# Patient Record
Sex: FEMALE | Race: WHITE | NOT HISPANIC OR LATINO | URBAN - METROPOLITAN AREA
[De-identification: names, ages, dates, MRNs, and addresses within clinical notes are randomized per-mention and may not be internally consistent; named-entity substitution may affect disease eponyms.]

---

## 2023-08-23 ENCOUNTER — TELEPHONE (OUTPATIENT)
Dept: PRIMARY CARE | Facility: CLINIC | Age: 58
End: 2023-08-23
Payer: COMMERCIAL

## 2023-08-23 NOTE — TELEPHONE ENCOUNTER
Pt lvm to cancel appt with Robusto because everything is cleared up.     Appt canceled lvm confirming cancellation

## 2023-08-24 ENCOUNTER — APPOINTMENT (OUTPATIENT)
Dept: PRIMARY CARE | Facility: CLINIC | Age: 58
End: 2023-08-24
Payer: COMMERCIAL

## 2023-11-08 ENCOUNTER — TELEPHONE (OUTPATIENT)
Dept: PRIMARY CARE | Facility: CLINIC | Age: 58
End: 2023-11-08
Payer: COMMERCIAL

## 2023-11-08 NOTE — TELEPHONE ENCOUNTER
Patient states has had stomach pain for a while but for the past 2 weeks she has had off and on diarrhea.  Would like to be seen if possible.  Please advise.

## 2024-08-29 ENCOUNTER — APPOINTMENT (OUTPATIENT)
Dept: PRIMARY CARE | Facility: CLINIC | Age: 59
End: 2024-08-29
Payer: COMMERCIAL

## 2024-08-29 VITALS
HEART RATE: 102 BPM | HEIGHT: 66 IN | SYSTOLIC BLOOD PRESSURE: 120 MMHG | DIASTOLIC BLOOD PRESSURE: 80 MMHG | WEIGHT: 183 LBS | OXYGEN SATURATION: 98 % | BODY MASS INDEX: 29.41 KG/M2

## 2024-08-29 DIAGNOSIS — Z12.31 VISIT FOR SCREENING MAMMOGRAM: ICD-10-CM

## 2024-08-29 DIAGNOSIS — R60.9 EDEMA, UNSPECIFIED TYPE: Primary | ICD-10-CM

## 2024-08-29 DIAGNOSIS — E55.9 VITAMIN D DEFICIENCY: ICD-10-CM

## 2024-08-29 DIAGNOSIS — N95.1 MENOPAUSAL HOT FLUSHES: ICD-10-CM

## 2024-08-29 DIAGNOSIS — E66.3 OVERWEIGHT WITH BODY MASS INDEX (BMI) OF 29 TO 29.9 IN ADULT: ICD-10-CM

## 2024-08-29 DIAGNOSIS — Z00.00 ANNUAL PHYSICAL EXAM: ICD-10-CM

## 2024-08-29 PROCEDURE — 99214 OFFICE O/P EST MOD 30 MIN: CPT | Performed by: FAMILY MEDICINE

## 2024-08-29 PROCEDURE — 3008F BODY MASS INDEX DOCD: CPT | Performed by: FAMILY MEDICINE

## 2024-08-29 RX ORDER — SPIRONOLACTONE 50 MG/1
1 TABLET, FILM COATED ORAL
COMMUNITY
Start: 2024-08-05

## 2024-08-29 RX ORDER — CHOLECALCIFEROL (VITAMIN D3) 50 MCG
2000 TABLET ORAL DAILY
COMMUNITY

## 2024-08-29 ASSESSMENT — LIFESTYLE VARIABLES
HOW MANY STANDARD DRINKS CONTAINING ALCOHOL DO YOU HAVE ON A TYPICAL DAY: 1 OR 2
HOW OFTEN DO YOU HAVE A DRINK CONTAINING ALCOHOL: MONTHLY OR LESS
HOW OFTEN DO YOU HAVE SIX OR MORE DRINKS ON ONE OCCASION: NEVER
AUDIT-C TOTAL SCORE: 1
SKIP TO QUESTIONS 9-10: 1

## 2024-08-29 NOTE — PATIENT INSTRUCTIONS
There are many weight loss medication options as listed below:  Oral forms: Contrave, Qsymia  Injectable forms: Saxenda, Wegovy, Zepbound  Please check with your insurance company regarding coverage - please confirm what diagnosis these are covered under as some insurance companies will not cover these medications unless you have a diagnosis of Diabetes or Prediabetes.    Once you confirm coverage please call the office to arrange an in office appointment to discuss your weight loss journey and which options we feel are most appropriative for you.

## 2024-08-29 NOTE — PROGRESS NOTES
"Subjective   Patient ID: Cristina Huff is a 59 y.o. female who presents for Leg Swelling (Has been going on and off, but seems to be worse now and not going away.) and Fatigue (More fatigue and brain fog- getting worse.).    HPI  MENOPAUSAL SYMPTOMS:  \"BRAIN FOG\" - Difficulty with quick recall and expressing symptoms  More forgetful    WEIGHT GAIN: gradually over the last few weeks  MENOPAUSAL - 5 years, hot flashes a few times per day currently    LEG SWELLING: has been worsening over the last few months  No changes in diet or activty    She has been on Spironolactone for the last month or so for  facial hair    FATIGUE: sleep disrupted 2 times to void  Usually not bad  By 3 pm she is ready for a nap  She does snore  but no apnea noted    Remote history of cardiac ablation  Previously followed by JOHN Jay at [facility]  Last ablation 16-20 years ago    Review of Systems    Review of Systems negative except as noted in HPI and Chief complaint.     Objective   Patient Health Questionnaire-9 Score: 8     REY-7 Total Score: 2     VITALS:  /80 (BP Location: Left arm, Patient Position: Sitting, BP Cuff Size: Adult)   Pulse 102   Ht 1.676 m (5' 6\")   Wt 83 kg (183 lb)   SpO2 98%   BMI 29.54 kg/m²      Physical Exam  Constitutional:       General: She is not in acute distress.     Appearance: Normal appearance. She is not ill-appearing.   HENT:      Head: Normocephalic and atraumatic.   Neck:      Vascular: No carotid bruit.   Cardiovascular:      Rate and Rhythm: Normal rate and regular rhythm.      Pulses: Normal pulses.      Heart sounds: Normal heart sounds. No murmur heard.     No gallop.   Pulmonary:      Effort: Pulmonary effort is normal.      Breath sounds: Normal breath sounds. No wheezing, rhonchi or rales.   Musculoskeletal:      Cervical back: Normal range of motion and neck supple. No rigidity or tenderness.   Lymphadenopathy:      Cervical: No cervical adenopathy.   Skin:     General: Skin " is warm and dry.   Neurological:      Mental Status: She is alert.   Psychiatric:         Mood and Affect: Mood normal.         Behavior: Behavior normal.         Assessment/Plan   Problem List Items Addressed This Visit    None  Visit Diagnoses       Edema, unspecified type    -  Primary    Menopausal hot flushes        Visit for screening mammogram        Relevant Orders    BI mammo bilateral screening tomosynthesis    Vitamin D deficiency        Relevant Orders    Vitamin D 25-Hydroxy,Total (for eval of Vitamin D levels)    Annual physical exam        Relevant Orders    CBC    Comprehensive Metabolic Panel    Lipid Panel    TSH with reflex to Free T4 if abnormal            FOLLOW UP  for CPE and review of labs , SOONER WITH ANY PROBLEMS OR CONCERNS.

## 2024-09-04 PROBLEM — E66.3 OVERWEIGHT WITH BODY MASS INDEX (BMI) OF 29 TO 29.9 IN ADULT: Status: ACTIVE | Noted: 2024-09-04

## 2024-09-04 ASSESSMENT — PATIENT HEALTH QUESTIONNAIRE - PHQ9
7. TROUBLE CONCENTRATING ON THINGS, SUCH AS READING THE NEWSPAPER OR WATCHING TELEVISION: MORE THAN HALF THE DAYS
3. TROUBLE FALLING OR STAYING ASLEEP OR SLEEPING TOO MUCH: SEVERAL DAYS
9. THOUGHTS THAT YOU WOULD BE BETTER OFF DEAD, OR OF HURTING YOURSELF: NOT AT ALL
8. MOVING OR SPEAKING SO SLOWLY THAT OTHER PEOPLE COULD HAVE NOTICED. OR THE OPPOSITE, BEING SO FIGETY OR RESTLESS THAT YOU HAVE BEEN MOVING AROUND A LOT MORE THAN USUAL: NOT AT ALL
5. POOR APPETITE OR OVEREATING: SEVERAL DAYS
SUM OF ALL RESPONSES TO PHQ9 QUESTIONS 1 AND 2: 1
4. FEELING TIRED OR HAVING LITTLE ENERGY: MORE THAN HALF THE DAYS
2. FEELING DOWN, DEPRESSED OR HOPELESS: NOT AT ALL
SUM OF ALL RESPONSES TO PHQ QUESTIONS 1-9: 8
6. FEELING BAD ABOUT YOURSELF - OR THAT YOU ARE A FAILURE OR HAVE LET YOURSELF OR YOUR FAMILY DOWN: SEVERAL DAYS
1. LITTLE INTEREST OR PLEASURE IN DOING THINGS: SEVERAL DAYS
10. IF YOU CHECKED OFF ANY PROBLEMS, HOW DIFFICULT HAVE THESE PROBLEMS MADE IT FOR YOU TO DO YOUR WORK, TAKE CARE OF THINGS AT HOME, OR GET ALONG WITH OTHER PEOPLE: NOT DIFFICULT AT ALL

## 2024-09-04 ASSESSMENT — ANXIETY QUESTIONNAIRES
2. NOT BEING ABLE TO STOP OR CONTROL WORRYING: NOT AT ALL
GAD7 TOTAL SCORE: 2
IF YOU CHECKED OFF ANY PROBLEMS ON THIS QUESTIONNAIRE, HOW DIFFICULT HAVE THESE PROBLEMS MADE IT FOR YOU TO DO YOUR WORK, TAKE CARE OF THINGS AT HOME, OR GET ALONG WITH OTHER PEOPLE: NOT DIFFICULT AT ALL
1. FEELING NERVOUS, ANXIOUS, OR ON EDGE: SEVERAL DAYS
7. FEELING AFRAID AS IF SOMETHING AWFUL MIGHT HAPPEN: NOT AT ALL
5. BEING SO RESTLESS THAT IT IS HARD TO SIT STILL: NOT AT ALL
6. BECOMING EASILY ANNOYED OR IRRITABLE: SEVERAL DAYS
3. WORRYING TOO MUCH ABOUT DIFFERENT THINGS: NOT AT ALL
4. TROUBLE RELAXING: NOT AT ALL

## 2024-09-12 ENCOUNTER — LAB (OUTPATIENT)
Dept: LAB | Facility: LAB | Age: 59
End: 2024-09-12
Payer: COMMERCIAL

## 2024-09-12 DIAGNOSIS — R73.01 ABNORMAL FASTING GLUCOSE: ICD-10-CM

## 2024-09-12 DIAGNOSIS — E55.9 VITAMIN D DEFICIENCY: ICD-10-CM

## 2024-09-12 DIAGNOSIS — Z00.00 ANNUAL PHYSICAL EXAM: ICD-10-CM

## 2024-09-12 LAB
25(OH)D3 SERPL-MCNC: 27 NG/ML (ref 30–100)
ALBUMIN SERPL BCP-MCNC: 4.5 G/DL (ref 3.4–5)
ALP SERPL-CCNC: 89 U/L (ref 33–136)
ALT SERPL W P-5'-P-CCNC: 32 U/L (ref 7–45)
ANION GAP SERPL CALC-SCNC: 16 MMOL/L (ref 10–20)
AST SERPL W P-5'-P-CCNC: 19 U/L (ref 9–39)
BILIRUB SERPL-MCNC: 0.6 MG/DL (ref 0–1.2)
BUN SERPL-MCNC: 13 MG/DL (ref 6–23)
CALCIUM SERPL-MCNC: 9.7 MG/DL (ref 8.6–10.6)
CHLORIDE SERPL-SCNC: 103 MMOL/L (ref 98–107)
CHOLEST SERPL-MCNC: 231 MG/DL (ref 0–199)
CHOLESTEROL/HDL RATIO: 3.3
CO2 SERPL-SCNC: 26 MMOL/L (ref 21–32)
CREAT SERPL-MCNC: 0.82 MG/DL (ref 0.5–1.05)
EGFRCR SERPLBLD CKD-EPI 2021: 81 ML/MIN/1.73M*2
ERYTHROCYTE [DISTWIDTH] IN BLOOD BY AUTOMATED COUNT: 13.3 % (ref 11.5–14.5)
GLUCOSE SERPL-MCNC: 107 MG/DL (ref 74–99)
HCT VFR BLD AUTO: 46.6 % (ref 36–46)
HDLC SERPL-MCNC: 70.5 MG/DL
HGB BLD-MCNC: 15.4 G/DL (ref 12–16)
LDLC SERPL CALC-MCNC: 127 MG/DL
MCH RBC QN AUTO: 30.3 PG (ref 26–34)
MCHC RBC AUTO-ENTMCNC: 33 G/DL (ref 32–36)
MCV RBC AUTO: 92 FL (ref 80–100)
NON HDL CHOLESTEROL: 161 MG/DL (ref 0–149)
NRBC BLD-RTO: 0 /100 WBCS (ref 0–0)
PLATELET # BLD AUTO: 297 X10*3/UL (ref 150–450)
POTASSIUM SERPL-SCNC: 4.5 MMOL/L (ref 3.5–5.3)
PROT SERPL-MCNC: 7.2 G/DL (ref 6.4–8.2)
RBC # BLD AUTO: 5.08 X10*6/UL (ref 4–5.2)
SODIUM SERPL-SCNC: 140 MMOL/L (ref 136–145)
TRIGL SERPL-MCNC: 170 MG/DL (ref 0–149)
TSH SERPL-ACNC: 3.19 MIU/L (ref 0.44–3.98)
VLDL: 34 MG/DL (ref 0–40)
WBC # BLD AUTO: 5.2 X10*3/UL (ref 4.4–11.3)

## 2024-09-12 PROCEDURE — 80053 COMPREHEN METABOLIC PANEL: CPT

## 2024-09-12 PROCEDURE — 36415 COLL VENOUS BLD VENIPUNCTURE: CPT

## 2024-09-12 PROCEDURE — 84443 ASSAY THYROID STIM HORMONE: CPT

## 2024-09-12 PROCEDURE — 83036 HEMOGLOBIN GLYCOSYLATED A1C: CPT

## 2024-09-12 PROCEDURE — 82306 VITAMIN D 25 HYDROXY: CPT

## 2024-09-12 PROCEDURE — 85027 COMPLETE CBC AUTOMATED: CPT

## 2024-09-12 PROCEDURE — 80061 LIPID PANEL: CPT

## 2024-09-13 DIAGNOSIS — R73.01 ABNORMAL FASTING GLUCOSE: Primary | ICD-10-CM

## 2024-09-13 LAB
EST. AVERAGE GLUCOSE BLD GHB EST-MCNC: 131 MG/DL
HBA1C MFR BLD: 6.2 %

## 2024-09-19 ENCOUNTER — APPOINTMENT (OUTPATIENT)
Dept: RADIOLOGY | Facility: CLINIC | Age: 59
End: 2024-09-19
Payer: COMMERCIAL

## 2024-09-19 ENCOUNTER — APPOINTMENT (OUTPATIENT)
Dept: PRIMARY CARE | Facility: CLINIC | Age: 59
End: 2024-09-19
Payer: COMMERCIAL

## 2024-09-19 VITALS
HEIGHT: 66 IN | HEART RATE: 109 BPM | WEIGHT: 184 LBS | DIASTOLIC BLOOD PRESSURE: 65 MMHG | OXYGEN SATURATION: 94 % | TEMPERATURE: 98 F | SYSTOLIC BLOOD PRESSURE: 92 MMHG | BODY MASS INDEX: 29.57 KG/M2

## 2024-09-19 DIAGNOSIS — Z12.11 SCREENING FOR COLON CANCER: ICD-10-CM

## 2024-09-19 DIAGNOSIS — Z00.00 ANNUAL PHYSICAL EXAM: Primary | ICD-10-CM

## 2024-09-19 DIAGNOSIS — R73.03 PRE-DIABETES: ICD-10-CM

## 2024-09-19 DIAGNOSIS — E78.2 MIXED HYPERLIPIDEMIA: ICD-10-CM

## 2024-09-19 DIAGNOSIS — E88.819 INSULIN RESISTANCE: ICD-10-CM

## 2024-09-19 PROCEDURE — 3008F BODY MASS INDEX DOCD: CPT | Performed by: FAMILY MEDICINE

## 2024-09-19 PROCEDURE — 99396 PREV VISIT EST AGE 40-64: CPT | Performed by: FAMILY MEDICINE

## 2024-09-19 PROCEDURE — 99213 OFFICE O/P EST LOW 20 MIN: CPT | Performed by: FAMILY MEDICINE

## 2024-09-19 RX ORDER — METFORMIN HYDROCHLORIDE 500 MG/1
500 TABLET ORAL
Qty: 200 TABLET | Refills: 1 | Status: SHIPPED | OUTPATIENT
Start: 2024-09-19 | End: 2025-10-24

## 2024-09-19 ASSESSMENT — PATIENT HEALTH QUESTIONNAIRE - PHQ9
6. FEELING BAD ABOUT YOURSELF - OR THAT YOU ARE A FAILURE OR HAVE LET YOURSELF OR YOUR FAMILY DOWN: NOT AT ALL
9. THOUGHTS THAT YOU WOULD BE BETTER OFF DEAD, OR OF HURTING YOURSELF: NOT AT ALL
10. IF YOU CHECKED OFF ANY PROBLEMS, HOW DIFFICULT HAVE THESE PROBLEMS MADE IT FOR YOU TO DO YOUR WORK, TAKE CARE OF THINGS AT HOME, OR GET ALONG WITH OTHER PEOPLE: NOT DIFFICULT AT ALL
2. FEELING DOWN, DEPRESSED OR HOPELESS: SEVERAL DAYS
3. TROUBLE FALLING OR STAYING ASLEEP OR SLEEPING TOO MUCH: SEVERAL DAYS
8. MOVING OR SPEAKING SO SLOWLY THAT OTHER PEOPLE COULD HAVE NOTICED. OR THE OPPOSITE, BEING SO FIGETY OR RESTLESS THAT YOU HAVE BEEN MOVING AROUND A LOT MORE THAN USUAL: NOT AT ALL
SUM OF ALL RESPONSES TO PHQ QUESTIONS 1-9: 7
SUM OF ALL RESPONSES TO PHQ9 QUESTIONS 1 AND 2: 2
7. TROUBLE CONCENTRATING ON THINGS, SUCH AS READING THE NEWSPAPER OR WATCHING TELEVISION: SEVERAL DAYS
4. FEELING TIRED OR HAVING LITTLE ENERGY: MORE THAN HALF THE DAYS
5. POOR APPETITE OR OVEREATING: SEVERAL DAYS
1. LITTLE INTEREST OR PLEASURE IN DOING THINGS: SEVERAL DAYS

## 2024-09-19 ASSESSMENT — ANXIETY QUESTIONNAIRES
1. FEELING NERVOUS, ANXIOUS, OR ON EDGE: SEVERAL DAYS
4. TROUBLE RELAXING: NOT AT ALL
GAD7 TOTAL SCORE: 2
7. FEELING AFRAID AS IF SOMETHING AWFUL MIGHT HAPPEN: NOT AT ALL
5. BEING SO RESTLESS THAT IT IS HARD TO SIT STILL: NOT AT ALL
6. BECOMING EASILY ANNOYED OR IRRITABLE: SEVERAL DAYS
2. NOT BEING ABLE TO STOP OR CONTROL WORRYING: NOT AT ALL
3. WORRYING TOO MUCH ABOUT DIFFERENT THINGS: NOT AT ALL
IF YOU CHECKED OFF ANY PROBLEMS ON THIS QUESTIONNAIRE, HOW DIFFICULT HAVE THESE PROBLEMS MADE IT FOR YOU TO DO YOUR WORK, TAKE CARE OF THINGS AT HOME, OR GET ALONG WITH OTHER PEOPLE: NOT DIFFICULT AT ALL

## 2024-09-19 ASSESSMENT — PAIN SCALES - GENERAL: PAINLEVEL: 0-NO PAIN

## 2024-09-19 NOTE — PROGRESS NOTES
"Blade Huff is a 59 y.o. female and is here for a comprehensive physical exam    Do you take any herbs or supplements that were not prescribed by a doctor? yes  Black Elderberry, Magnesium, vitamin D  Are you taking calcium supplements? no  Are you taking aspirin daily? no    SOC Hx:   Tobacco Use: Low Risk  (9/19/2024)    Patient History     Smoking Tobacco Use: Never     Smokeless Tobacco Use: Never     Passive Exposure: Not on file     Alcohol Use: Not At Risk (8/29/2024)    AUDIT-C     Frequency of Alcohol Consumption: Monthly or less     Average Number of Drinks: 1 or 2     Frequency of Binge Drinking: Never     DIET: general    EXERCISE:  irregularly - walking, bike    ELIMINATION: leans toward diarrhea  COLON CA SCREENING: ordered    URINARY SYSTEM:  nighttime 2-3 times at night    SLEEP:  minimally disturbed    MOODS: stress manageable     REY-7 Total Score: 2      History:  Menstrual cycles - menopausal  LMP: No LMP recorded.  Last pap date: remote  Abnormal pap? no  MAMMO: ordered  DEXA: 2/13/2020    Review of Systems   Review of Systems negative except as noted in HPI and Chief complaint.     Objective     VITALS:  BP 92/65 (BP Location: Left arm, Patient Position: Sitting, BP Cuff Size: Adult)   Pulse 109   Temp 36.7 °C (98 °F) (Temporal)   Ht 1.676 m (5' 6\")   Wt 83.5 kg (184 lb)   SpO2 94%   BMI 29.70 kg/m²      Physical Exam  Constitutional:       General: She is not in acute distress.     Appearance: Normal appearance.   HENT:      Head: Normocephalic and atraumatic.      Right Ear: Tympanic membrane, ear canal and external ear normal.      Left Ear: Tympanic membrane, ear canal and external ear normal.      Nose: Nose normal.      Mouth/Throat:      Mouth: Mucous membranes are moist.      Pharynx: No oropharyngeal exudate or posterior oropharyngeal erythema.   Eyes:      Extraocular Movements: Extraocular movements intact.      Conjunctiva/sclera: Conjunctivae normal.     "  Pupils: Pupils are equal, round, and reactive to light.   Cardiovascular:      Rate and Rhythm: Normal rate and regular rhythm.      Heart sounds: No murmur heard.  Pulmonary:      Effort: Pulmonary effort is normal.      Breath sounds: Normal breath sounds.   Abdominal:      General: Bowel sounds are normal.      Palpations: Abdomen is soft.   Musculoskeletal:         General: Normal range of motion.      Cervical back: No rigidity.   Lymphadenopathy:      Cervical: No cervical adenopathy.   Skin:     General: Skin is warm and dry.      Findings: No rash.   Neurological:      General: No focal deficit present.      Mental Status: She is alert and oriented to person, place, and time.      Cranial Nerves: No cranial nerve deficit.      Gait: Gait normal.   Psychiatric:         Mood and Affect: Mood normal.         Behavior: Behavior normal.         Assessment/Plan     Healthy female exam.      1. Reviewed recent fasting labs.     2. Patient Counseling:  --Nutrition: Stressed importance of moderation in sodium/caffeine intake, saturated fat and cholesterol, caloric balance, sufficient intake of fresh fruits, vegetables, fiber, calcium, iron, and 1 mg of folate supplement per day (for females capable of pregnancy).  --Exercise: Stressed the importance of regular exercise.   --Immunizations reviewed.  --Discussed benefits of screening colonoscopy(patients > 45 years of age)    3. Discussed the patient's BMI with her.  The BMI is above average. The patient received Current weight:    Weight change since last visit (-) denotes wt loss     Weight loss needed to achieve BMI 25:   Lbs  Weight loss needed to achieve BMI 30:   Lbs because they have an above normal BMI.  BMI was above normal measurement. Current weight:    Weight change since last visit (-) denotes wt loss     Weight loss needed to achieve BMI 25:   Lbs  Weight loss needed to achieve BMI 30:   Lbs  Provided instructions on dietary changes  Provided  instructions on exercise  Advised to Increase physical activity    Problem List Items Addressed This Visit    None  Visit Diagnoses       Annual physical exam    -  Primary    Mixed hyperlipidemia        Relevant Orders    CT cardiac scoring wo IV contrast    Screening for colon cancer        Relevant Orders    Colonoscopy Screening; Average Risk Patient    Insulin resistance        Relevant Medications    semaglutide 0.25 mg or 0.5 mg (2 mg/3 mL) pen injector    metFORMIN (Glucophage) 500 mg tablet    Other Relevant Orders    Referral to Nutrition Services    Pre-diabetes        Relevant Medications    semaglutide 0.25 mg or 0.5 mg (2 mg/3 mL) pen injector    metFORMIN (Glucophage) 500 mg tablet    Other Relevant Orders    Referral to Nutrition Services             Follow up  1 month if we are able to start GLP-1

## 2024-09-22 ENCOUNTER — PATIENT MESSAGE (OUTPATIENT)
Dept: PRIMARY CARE | Facility: CLINIC | Age: 59
End: 2024-09-22
Payer: COMMERCIAL

## 2024-09-23 ENCOUNTER — HOSPITAL ENCOUNTER (OUTPATIENT)
Dept: RADIOLOGY | Facility: CLINIC | Age: 59
Discharge: HOME | End: 2024-09-23
Payer: COMMERCIAL

## 2024-09-23 VITALS — BODY MASS INDEX: 29.58 KG/M2 | HEIGHT: 66 IN | WEIGHT: 184.08 LBS

## 2024-09-23 DIAGNOSIS — Z12.31 VISIT FOR SCREENING MAMMOGRAM: ICD-10-CM

## 2024-09-23 DIAGNOSIS — E55.9 VITAMIN D DEFICIENCY: Primary | ICD-10-CM

## 2024-09-23 PROCEDURE — 77063 BREAST TOMOSYNTHESIS BI: CPT | Performed by: RADIOLOGY

## 2024-09-23 PROCEDURE — 77067 SCR MAMMO BI INCL CAD: CPT

## 2024-09-23 PROCEDURE — 77067 SCR MAMMO BI INCL CAD: CPT | Performed by: RADIOLOGY

## 2024-09-23 RX ORDER — ERGOCALCIFEROL 1.25 MG/1
50000 CAPSULE ORAL WEEKLY
Qty: 12 CAPSULE | Refills: 0 | Status: SHIPPED | OUTPATIENT
Start: 2024-09-23 | End: 2024-12-16

## 2024-09-26 DIAGNOSIS — R92.8 ABNORMAL MAMMOGRAM OF RIGHT BREAST: Primary | ICD-10-CM

## 2024-10-03 ENCOUNTER — HOSPITAL ENCOUNTER (OUTPATIENT)
Dept: RADIOLOGY | Facility: CLINIC | Age: 59
Discharge: HOME | End: 2024-10-03
Payer: COMMERCIAL

## 2024-10-03 DIAGNOSIS — R92.8 ABNORMAL MAMMOGRAM OF RIGHT BREAST: ICD-10-CM

## 2024-10-03 PROBLEM — E78.2 MIXED HYPERLIPIDEMIA: Status: ACTIVE | Noted: 2024-10-03

## 2024-10-03 PROBLEM — E88.819 INSULIN RESISTANCE: Status: ACTIVE | Noted: 2024-10-03

## 2024-10-03 PROBLEM — R73.03 PRE-DIABETES: Status: ACTIVE | Noted: 2024-10-03

## 2024-10-03 PROCEDURE — 77061 BREAST TOMOSYNTHESIS UNI: CPT | Mod: RT

## 2024-10-03 NOTE — ASSESSMENT & PLAN NOTE
Trial of Metformin.  Looking into PA for GLP-1  Reviewed risks and benefits of weight loss treatment with GLP-1 -   These include risk of pancreatitis, possible thyroid medullary tumors and multiple endocrine neoplasia syndrome.    Side effects include constipation, bowel paresthesias, nausea, vomiting, headache.    Weight loss treatment should include dietary modifications with dietary/nutritionist consultation as well as regular exercise.  The goal for activity levels is 150 minutes per week of activity including cardiovascular and weight training.

## 2024-10-03 NOTE — ASSESSMENT & PLAN NOTE
Reviewed recent labs.  Trial of Metformin and then consider Semaglutide.  Reviewed risks and benefits of weight loss treatment with GLP-1 -   These include risk of pancreatitis, possible thyroid medullary tumors and multiple endocrine neoplasia syndrome.    Side effects include constipation, bowel paresthesias, nausea, vomiting, headache.    Weight loss treatment should include dietary modifications with dietary/nutritionist consultation as well as regular exercise.  The goal for activity levels is 150 minutes per week of activity including cardiovascular and weight training.

## 2024-12-02 ENCOUNTER — APPOINTMENT (OUTPATIENT)
Dept: PHARMACY | Facility: HOSPITAL | Age: 59
End: 2024-12-02
Payer: COMMERCIAL

## 2024-12-02 DIAGNOSIS — E66.3 OVERWEIGHT WITH BODY MASS INDEX (BMI) OF 29 TO 29.9 IN ADULT: ICD-10-CM

## 2024-12-02 DIAGNOSIS — R73.03 PRE-DIABETES: ICD-10-CM

## 2024-12-02 DIAGNOSIS — E78.2 MIXED HYPERLIPIDEMIA: ICD-10-CM

## 2024-12-02 NOTE — PROGRESS NOTES
Patient ID: Cristina Huff is a 59 y.o. female who presents for weight managment and prediabetes. Patient was referred to pharmacy for GLP1 coverage assistance.     Referring Provider: Helga Monique DO  PCP: Helga Monique DO Last visit with PCP: 9/19/24 Next visit with PCP: Not scheduled at this time      Subjective     Pertinent Past Medical History  No history of pancreatitis   Triglycerides: 170 mg/dL (9/12/24)  No history of medullary thyroid cancer  No history of MEN2    Past DM Medications  Metformin: discontinued after 1.5 months of therapy due to GI upset     Past Weight Loss Efforts  Intermittent Fasting: patient did not find this helpful    Exercise  Patient states she stays active but she does not go to the gym     DISCUSSION  Test claims run through insurance shows:  Mounjaro/Ozempic/Trulicity require prior authorization  Made patient aware that likely will not be approved due to lack of type II diabetes diagnosis   Wegovy and Zepbound and not covered products  Patient cannot afford cash price or coupon preciado   Patient does NOT qualify for  Patient Assistance Program   Counseled on GLP1 MOA and dosing schedule   Patient has not met with a dietician in the past and is willing to set up appointment   Made patient aware that PCP placed referral a few months ago  Patient may be interested in discussing other weight loss options such as Contrave and Qsymia with PCP after meeting with dietician  Patient has no further questions or concerns      Objective     There were no vitals taken for this visit.   BP Readings from Last 4 Encounters:   09/19/24 92/65   08/29/24 120/80   10/20/22 118/80   10/07/21 118/76      There were no vitals filed for this visit.     Labs  Lab Results   Component Value Date    BILITOT 0.6 09/12/2024    CALCIUM 9.7 09/12/2024    CO2 26 09/12/2024     09/12/2024    CREATININE 0.82 09/12/2024    GLUCOSE 107 (H) 09/12/2024    ALKPHOS 89 09/12/2024    K 4.5 09/12/2024    PROT  7.2 09/12/2024     09/12/2024    AST 19 09/12/2024    ALT 32 09/12/2024    BUN 13 09/12/2024    ANIONGAP 16 09/12/2024    ALBUMIN 4.5 09/12/2024    GFRF 89 10/27/2022     Lab Results   Component Value Date    TRIG 170 (H) 09/12/2024    CHOL 231 (H) 09/12/2024    LDLCALC 127 (H) 09/12/2024    HDL 70.5 09/12/2024     Lab Results   Component Value Date    HGBA1C 6.2 (H) 09/12/2024       Current Outpatient Medications   Medication Instructions    cholecalciferol (VITAMIN D-3) 2,000 Units, oral, Daily    ergocalciferol (VITAMIN D-2) 50,000 Units, oral, Weekly    metFORMIN (GLUCOPHAGE) 500 mg, oral, 2 times daily (morning and late afternoon)    spironolactone (Aldactone) 50 mg tablet 1 tablet, oral, Daily (0630)       Assessment/Plan   Will submit PA for Mounjaro  Patient to schedule appointment with dietician         Follow-up: 1 week to check on PA status     Time spent with pt: Total length of time 20 (minutes) of the encounter and more than 50% was spent counseling the patient.    Jewell Farmer, PharmD    Continue all meds under the continuation of care with the referring provider and clinical pharmacy team.

## 2024-12-05 ENCOUNTER — HOSPITAL ENCOUNTER (OUTPATIENT)
Dept: RADIOLOGY | Facility: CLINIC | Age: 59
Discharge: HOME | End: 2024-12-05
Payer: COMMERCIAL

## 2024-12-05 ENCOUNTER — APPOINTMENT (OUTPATIENT)
Dept: NUTRITION | Facility: CLINIC | Age: 59
End: 2024-12-05
Payer: COMMERCIAL

## 2024-12-05 DIAGNOSIS — E78.2 MIXED HYPERLIPIDEMIA: ICD-10-CM

## 2024-12-05 PROCEDURE — 75571 CT HRT W/O DYE W/CA TEST: CPT

## 2024-12-09 ENCOUNTER — APPOINTMENT (OUTPATIENT)
Dept: PHARMACY | Facility: HOSPITAL | Age: 59
End: 2024-12-09
Payer: COMMERCIAL

## 2024-12-09 DIAGNOSIS — R73.03 PRE-DIABETES: ICD-10-CM

## 2024-12-09 DIAGNOSIS — E66.3 OVERWEIGHT WITH BODY MASS INDEX (BMI) OF 29 TO 29.9 IN ADULT: ICD-10-CM

## 2024-12-09 DIAGNOSIS — E78.2 MIXED HYPERLIPIDEMIA: ICD-10-CM

## 2024-12-10 NOTE — PROGRESS NOTES
Patient ID: Cristina Huff is a 59 y.o. female who presents for Weight Management. Patient was referred to pharmacy for GLP1 coverage assistance.      Referring Provider: Helga Monique DO  PCP: Helga Monique DO Last visit with PCP: 9/19/24 Next visit with PCP: Not scheduled at this time      Subjective   Pertinent Past Medical History  No history of pancreatitis   Triglycerides: 170 mg/dL (9/12/24)  No history of medullary thyroid cancer  No history of MEN2     Past DM Medications  Metformin: discontinued after 1.5 months of therapy due to GI upset      Past Weight Loss Efforts  Intermittent Fasting: patient did not find this helpful     Exercise  Patient states she stays active but she does not go to the gym      DISCUSSION  Prior authorization was DENIED for Mounjaro due to lack of Type II diabetes diagnosis  Likely PA's will be denied for all GLP1 indicated for Type II diabetes   Patient confirms that Zepbound/Wegovy coupon pricing is still too expensive to pursue  Although patient feels like she is eating healthy, she will schedule an appointment with a dietician   Patient may be interested in discussing other weight loss options such as Contrave and Qsymia with PCP after meeting with dietician  Patient has no further questions or concerns    Objective     There were no vitals taken for this visit.   BP Readings from Last 4 Encounters:   09/19/24 92/65   08/29/24 120/80   10/20/22 118/80   10/07/21 118/76      There were no vitals filed for this visit.     Labs  Lab Results   Component Value Date    BILITOT 0.6 09/12/2024    CALCIUM 9.7 09/12/2024    CO2 26 09/12/2024     09/12/2024    CREATININE 0.82 09/12/2024    GLUCOSE 107 (H) 09/12/2024    ALKPHOS 89 09/12/2024    K 4.5 09/12/2024    PROT 7.2 09/12/2024     09/12/2024    AST 19 09/12/2024    ALT 32 09/12/2024    BUN 13 09/12/2024    ANIONGAP 16 09/12/2024    ALBUMIN 4.5 09/12/2024    GFRF 89 10/27/2022     Lab Results   Component Value  Date    TRIG 170 (H) 09/12/2024    CHOL 231 (H) 09/12/2024    LDLCALC 127 (H) 09/12/2024    HDL 70.5 09/12/2024     Lab Results   Component Value Date    HGBA1C 6.2 (H) 09/12/2024       Current Outpatient Medications   Medication Instructions    cholecalciferol (VITAMIN D-3) 2,000 Units, oral, Daily    ergocalciferol (VITAMIN D-2) 50,000 Units, oral, Weekly    metFORMIN (GLUCOPHAGE) 500 mg, oral, 2 times daily (morning and late afternoon)    spironolactone (Aldactone) 50 mg tablet 1 tablet, oral, Daily (0630)       Assessment/Plan   Recommend patient scheduling an appointment with dietician      Follow-up: n/a     Time spent with pt: Total length of time 15 (minutes) of the encounter and more than 50% was spent counseling the patient.    Jewell Farmer, PharmD    Continue all meds under the continuation of care with the referring provider and clinical pharmacy team.

## 2024-12-11 ENCOUNTER — PATIENT MESSAGE (OUTPATIENT)
Dept: PRIMARY CARE | Facility: CLINIC | Age: 59
End: 2024-12-11
Payer: COMMERCIAL

## 2025-03-18 ENCOUNTER — TELEMEDICINE (OUTPATIENT)
Dept: PRIMARY CARE | Facility: CLINIC | Age: 60
End: 2025-03-18
Payer: COMMERCIAL

## 2025-03-18 DIAGNOSIS — F40.243 FEAR OF FLYING: Primary | ICD-10-CM

## 2025-03-18 DIAGNOSIS — E55.9 VITAMIN D DEFICIENCY: ICD-10-CM

## 2025-03-18 DIAGNOSIS — Z00.00 ANNUAL PHYSICAL EXAM: Primary | ICD-10-CM

## 2025-03-18 PROCEDURE — 1036F TOBACCO NON-USER: CPT | Performed by: FAMILY MEDICINE

## 2025-03-18 PROCEDURE — 99212 OFFICE O/P EST SF 10 MIN: CPT | Performed by: FAMILY MEDICINE

## 2025-03-18 RX ORDER — CLONAZEPAM 0.5 MG/1
TABLET ORAL
Qty: 10 TABLET | Refills: 0 | Status: SHIPPED | OUTPATIENT
Start: 2025-03-18

## 2025-03-18 NOTE — PROGRESS NOTES
Subjective   Patient ID: Cristina Huff is a 59 y.o. female who presents for Med Refill (Flying medications).    HPI    Virtual or Telephone Consent    An interactive audio and video telecommunication system which permits real time communications between the patient (at the originating site) and provider (at the distant site) was utilized to provide this telehealth service.   Verbal consent was requested and obtained from Cristina Huff on this date, 03/18/25 for a telehealth visit and the patient's location was confirmed at the time of the visit.      FLYING PHOBIA: in the past has ha anxiety with flights  Has used rare prn Clonazepam with great results.    OARRS:  Helga Monique DO on 3/18/2025  9:24 AM  I have personally reviewed the OARRS report for Cristina Huff. I have considered the risks of abuse, dependence, addiction and diversion        Review of Systems    Review of Systems negative except as noted in HPI and Chief complaint.     Objective             VITALS:  There were no vitals taken for this visit.     Physical Exam    Assessment/Plan   Assessment & Plan  Fear of flying  Prn Clonazepam refilled.  Reviewed risks, side effects and expected treatment course of medications.  Call with any problems or concerns.   Orders:    clonazePAM (KlonoPIN) 0.5 mg tablet; Take 20 minutes prior to flights         FOLLOW UP PRN, SOONER WITH ANY PROBLEMS OR CONCERNS.  Next CPE due 9/2025 - will place orders for upcoming visit.

## 2025-04-28 ENCOUNTER — PATIENT MESSAGE (OUTPATIENT)
Dept: PRIMARY CARE | Facility: CLINIC | Age: 60
End: 2025-04-28
Payer: COMMERCIAL

## 2025-05-08 ENCOUNTER — TELEPHONE (OUTPATIENT)
Dept: PRIMARY CARE | Facility: CLINIC | Age: 60
End: 2025-05-08
Payer: COMMERCIAL

## 2025-05-08 NOTE — TELEPHONE ENCOUNTER
Patient call requesting an appointment as soon as possible. Patient got denied for a long term insurance and the due date is 5/9 but she is willing to risk it

## 2025-05-09 NOTE — TELEPHONE ENCOUNTER
LVM so patient can call us back to provide us with more information regarding the appointment request

## 2025-07-14 ENCOUNTER — APPOINTMENT (OUTPATIENT)
Dept: PRIMARY CARE | Facility: CLINIC | Age: 60
End: 2025-07-14
Payer: COMMERCIAL

## 2025-07-14 VITALS
WEIGHT: 184.6 LBS | BODY MASS INDEX: 29.67 KG/M2 | HEART RATE: 105 BPM | SYSTOLIC BLOOD PRESSURE: 113 MMHG | OXYGEN SATURATION: 93 % | HEIGHT: 66 IN | DIASTOLIC BLOOD PRESSURE: 79 MMHG

## 2025-07-14 DIAGNOSIS — R07.9 CHEST PAIN, UNSPECIFIED TYPE: Primary | ICD-10-CM

## 2025-07-14 DIAGNOSIS — N95.1 MENOPAUSAL SYNDROME (HOT FLASHES): ICD-10-CM

## 2025-07-14 PROCEDURE — 1036F TOBACCO NON-USER: CPT | Performed by: FAMILY MEDICINE

## 2025-07-14 PROCEDURE — 93000 ELECTROCARDIOGRAM COMPLETE: CPT | Performed by: FAMILY MEDICINE

## 2025-07-14 PROCEDURE — 99214 OFFICE O/P EST MOD 30 MIN: CPT | Performed by: FAMILY MEDICINE

## 2025-07-14 PROCEDURE — 3008F BODY MASS INDEX DOCD: CPT | Performed by: FAMILY MEDICINE

## 2025-07-14 ASSESSMENT — PATIENT HEALTH QUESTIONNAIRE - PHQ9
9. THOUGHTS THAT YOU WOULD BE BETTER OFF DEAD, OR OF HURTING YOURSELF: NOT AT ALL
7. TROUBLE CONCENTRATING ON THINGS, SUCH AS READING THE NEWSPAPER OR WATCHING TELEVISION: NOT AT ALL
1. LITTLE INTEREST OR PLEASURE IN DOING THINGS: NOT AT ALL
3. TROUBLE FALLING OR STAYING ASLEEP OR SLEEPING TOO MUCH: NOT AT ALL
SUM OF ALL RESPONSES TO PHQ9 QUESTIONS 1 AND 2: 0
5. POOR APPETITE OR OVEREATING: NOT AT ALL
4. FEELING TIRED OR HAVING LITTLE ENERGY: SEVERAL DAYS
8. MOVING OR SPEAKING SO SLOWLY THAT OTHER PEOPLE COULD HAVE NOTICED. OR THE OPPOSITE, BEING SO FIGETY OR RESTLESS THAT YOU HAVE BEEN MOVING AROUND A LOT MORE THAN USUAL: NOT AT ALL
2. FEELING DOWN, DEPRESSED OR HOPELESS: NOT AT ALL
6. FEELING BAD ABOUT YOURSELF - OR THAT YOU ARE A FAILURE OR HAVE LET YOURSELF OR YOUR FAMILY DOWN: NOT AT ALL
SUM OF ALL RESPONSES TO PHQ QUESTIONS 1-9: 1

## 2025-07-14 ASSESSMENT — ANXIETY QUESTIONNAIRES
1. FEELING NERVOUS, ANXIOUS, OR ON EDGE: NOT AT ALL
4. TROUBLE RELAXING: NOT AT ALL
7. FEELING AFRAID AS IF SOMETHING AWFUL MIGHT HAPPEN: NOT AT ALL
6. BECOMING EASILY ANNOYED OR IRRITABLE: NOT AT ALL
2. NOT BEING ABLE TO STOP OR CONTROL WORRYING: NOT AT ALL
5. BEING SO RESTLESS THAT IT IS HARD TO SIT STILL: NOT AT ALL
GAD7 TOTAL SCORE: 0
3. WORRYING TOO MUCH ABOUT DIFFERENT THINGS: NOT AT ALL

## 2025-07-14 NOTE — PROGRESS NOTES
Subjective   HPI     History of Present Illness  Cristina Huff  is a 59-year-old female who presents with the following concerns.    CHEST PAIN AND ARM TINGLING:  She reports experiencing intermittent numbness and tingling in her arm, which occurs daily. She also mentions an episode of severe pain that confined her to bed for an entire day. She has not undergone an EKG yet. She does not experience any exertional symptoms such as worsening pain or shortness of breath when climbing stairs or walking. She has not noticed any change in the frequency of the arm tingling. She has not had a neck x-ray recently, but did have one in 2013 following an injury sustained during a vacation.  - Onset: Intermittent numbness and tingling occurring daily.  - Duration: Daily occurrence.  - Character: Numbness and tingling in the arm; severe pain episode confined her to bed for an entire day.  - Severity: Severe pain episode confined her to bed for an entire day.    SOB AND EDEMA  She reports mild shortness of breath, which she attributes to recent weight gain. She also notes occasional ankle swelling.  - Onset: Mild shortness of breath attributed to recent weight gain.  - Character: Mild shortness of breath; occasional ankle swelling.    SHOULDER PAIN  She does not have neck pain, but reports shoulder pain and tension, which she believes is stress-related. She has recently had a couple of massages to alleviate the tension.  - Onset: Stress-related.  - Location: Shoulder.  - Character: Pain and tension.  - Alleviating Factors: Massages.    Irregular Heart Rhythms  She occasionally experiences irregular heart rhythms, but is not on metoprolol.  - Character: Irregular heart rhythms.    HOT FLASHES WITH NAUSEA:  She describes experiencing hot flashes, characterized by sudden feelings of heat and sweating. She also reports episodes of nausea and dry heaving upon waking, which have occurred on several mornings. These symptoms have been severe  "enough to disrupt her workday, requiring her to rest for approximately 4.5 hours. She has not noticed any changes in bowel habits, such as diarrhea or blood in the stool, and does not experience abdominal pain. She has not made any recent changes to her diet or started any new supplements or vitamins.  - Onset: Upon waking, occurring on several mornings.  - Character: Hot flashes with sudden feelings of heat and sweating; nausea and dry heaving.  - Severity: Severe enough to disrupt her workday, requiring rest for approximately 4.5 hours.       Review of Systems  Review of Systems negative except as noted in HPI and Chief complaint.     Objective     VITALS:  /79   Pulse 105   Ht 1.676 m (5' 6\")   Wt 83.7 kg (184 lb 9.6 oz)   SpO2 93%   BMI 29.80 kg/m²     Patient Health Questionnaire-9 Score: 1    REY-7 Total Score: 0    Physical Exam  Constitutional:       General: She is not in acute distress.     Appearance: Normal appearance. She is not ill-appearing.   HENT:      Head: Normocephalic and atraumatic.   Neck:      Vascular: No carotid bruit.     Cardiovascular:      Rate and Rhythm: Normal rate and regular rhythm.      Pulses: Normal pulses.      Heart sounds: Normal heart sounds. No murmur heard.     No gallop.   Pulmonary:      Effort: Pulmonary effort is normal.      Breath sounds: Normal breath sounds. No wheezing, rhonchi or rales.     Musculoskeletal:      Cervical back: Normal range of motion and neck supple. No rigidity or tenderness.   Lymphadenopathy:      Cervical: No cervical adenopathy.     Skin:     General: Skin is warm and dry.     Neurological:      Mental Status: She is alert.     Psychiatric:         Mood and Affect: Mood normal.         Behavior: Behavior normal.         Assessment & Plan  Chest pain, unspecified type  - EKG performed today for further evaluation.  - Blood work ordered to rule out underlying conditions.  - Spironolactone 50 mg for EDEMA and ACNE.  Orders:    ECG 12 " lead (Clinic Performed)    Echocardiogram Stress Test; Future    Menopausal syndrome (hot flashes)  - Blood work ordered to rule out underlying conditions.            Assessment & Plan  Follow-up  - EKG today.         FOLLOW UP PRN pending review of above.    Helga Monique DO 07/14/25 3:22 PM    This medical note was created with the assistance of artificial intelligence (AI) for documentation purposes. The content has been reviewed and confirmed by the healthcare provider for accuracy and completeness. Patient consented to the use of audio recording and use of AI during their visit.

## 2025-08-04 ENCOUNTER — HOSPITAL ENCOUNTER (OUTPATIENT)
Dept: CARDIOLOGY | Facility: CLINIC | Age: 60
Discharge: HOME | End: 2025-08-04
Payer: COMMERCIAL

## 2025-08-04 DIAGNOSIS — R07.9 CHEST PAIN, UNSPECIFIED TYPE: ICD-10-CM

## 2025-08-04 PROCEDURE — 93350 STRESS TTE ONLY: CPT | Performed by: INTERNAL MEDICINE

## 2025-08-04 PROCEDURE — 93016 CV STRESS TEST SUPVJ ONLY: CPT | Performed by: INTERNAL MEDICINE

## 2025-08-04 PROCEDURE — 93018 CV STRESS TEST I&R ONLY: CPT | Performed by: INTERNAL MEDICINE

## 2025-08-04 PROCEDURE — 93017 CV STRESS TEST TRACING ONLY: CPT

## 2025-08-05 ENCOUNTER — PATIENT MESSAGE (OUTPATIENT)
Dept: PRIMARY CARE | Facility: CLINIC | Age: 60
End: 2025-08-05
Payer: COMMERCIAL

## 2025-08-18 DIAGNOSIS — E55.9 VITAMIN D DEFICIENCY: ICD-10-CM

## 2025-08-18 DIAGNOSIS — Z00.00 ANNUAL PHYSICAL EXAM: ICD-10-CM

## 2025-08-20 LAB
25(OH)D3+25(OH)D2 SERPL-MCNC: 29 NG/ML (ref 30–100)
ALBUMIN SERPL-MCNC: 4.4 G/DL (ref 3.6–5.1)
ALP SERPL-CCNC: 76 U/L (ref 37–153)
ALT SERPL-CCNC: 25 U/L (ref 6–29)
ANION GAP SERPL CALCULATED.4IONS-SCNC: 10 MMOL/L (CALC) (ref 7–17)
AST SERPL-CCNC: 19 U/L (ref 10–35)
BILIRUB SERPL-MCNC: 0.5 MG/DL (ref 0.2–1.2)
BUN SERPL-MCNC: 9 MG/DL (ref 7–25)
CALCIUM SERPL-MCNC: 9.3 MG/DL (ref 8.6–10.4)
CHLORIDE SERPL-SCNC: 104 MMOL/L (ref 98–110)
CHOLEST SERPL-MCNC: 206 MG/DL
CHOLEST/HDLC SERPL: 2.6 (CALC)
CO2 SERPL-SCNC: 26 MMOL/L (ref 20–32)
CREAT SERPL-MCNC: 0.68 MG/DL (ref 0.5–1.05)
EGFRCR SERPLBLD CKD-EPI 2021: 98 ML/MIN/1.73M2
ERYTHROCYTE [DISTWIDTH] IN BLOOD BY AUTOMATED COUNT: 13.6 % (ref 11–15)
GLUCOSE SERPL-MCNC: 108 MG/DL (ref 65–99)
HBA1C MFR BLD: 6.4 %
HCT VFR BLD AUTO: 44.4 % (ref 35–45)
HDLC SERPL-MCNC: 79 MG/DL
HGB BLD-MCNC: 14.4 G/DL (ref 11.7–15.5)
LDLC SERPL CALC-MCNC: 106 MG/DL (CALC)
MCH RBC QN AUTO: 30.3 PG (ref 27–33)
MCHC RBC AUTO-ENTMCNC: 32.4 G/DL (ref 32–36)
MCV RBC AUTO: 93.3 FL (ref 80–100)
NONHDLC SERPL-MCNC: 127 MG/DL (CALC)
PLATELET # BLD AUTO: 256 THOUSAND/UL (ref 140–400)
PMV BLD REES-ECKER: 10 FL (ref 7.5–12.5)
POTASSIUM SERPL-SCNC: 4.2 MMOL/L (ref 3.5–5.3)
PROT SERPL-MCNC: 6.9 G/DL (ref 6.1–8.1)
RBC # BLD AUTO: 4.76 MILLION/UL (ref 3.8–5.1)
SODIUM SERPL-SCNC: 140 MMOL/L (ref 135–146)
TRIGL SERPL-MCNC: 111 MG/DL
TSH SERPL-ACNC: 2.37 MIU/L (ref 0.4–4.5)
WBC # BLD AUTO: 6.4 THOUSAND/UL (ref 3.8–10.8)

## 2025-09-04 ENCOUNTER — APPOINTMENT (OUTPATIENT)
Dept: PRIMARY CARE | Facility: CLINIC | Age: 60
End: 2025-09-04
Payer: COMMERCIAL

## 2025-09-30 ENCOUNTER — APPOINTMENT (OUTPATIENT)
Dept: PRIMARY CARE | Facility: CLINIC | Age: 60
End: 2025-09-30
Payer: COMMERCIAL